# Patient Record
Sex: MALE | Race: WHITE | Employment: FULL TIME | URBAN - METROPOLITAN AREA
[De-identification: names, ages, dates, MRNs, and addresses within clinical notes are randomized per-mention and may not be internally consistent; named-entity substitution may affect disease eponyms.]

---

## 2017-04-05 ENCOUNTER — OFFICE VISIT (OUTPATIENT)
Dept: FAMILY MEDICINE | Facility: CLINIC | Age: 27
End: 2017-04-05

## 2017-04-05 VITALS
BODY MASS INDEX: 32.55 KG/M2 | WEIGHT: 240 LBS | SYSTOLIC BLOOD PRESSURE: 124 MMHG | OXYGEN SATURATION: 98 % | DIASTOLIC BLOOD PRESSURE: 80 MMHG | HEART RATE: 68 BPM

## 2017-04-05 DIAGNOSIS — F90.0 ATTENTION DEFICIT HYPERACTIVITY DISORDER (ADHD), PREDOMINANTLY INATTENTIVE TYPE: Primary | ICD-10-CM

## 2017-04-05 PROCEDURE — 99213 OFFICE O/P EST LOW 20 MIN: CPT | Performed by: FAMILY MEDICINE

## 2017-04-05 RX ORDER — DEXTROAMPHETAMINE SACCHARATE, AMPHETAMINE ASPARTATE MONOHYDRATE, DEXTROAMPHETAMINE SULFATE AND AMPHETAMINE SULFATE 7.5; 7.5; 7.5; 7.5 MG/1; MG/1; MG/1; MG/1
30 CAPSULE, EXTENDED RELEASE ORAL DAILY
Qty: 30 CAPSULE | Refills: 0 | Status: SHIPPED | OUTPATIENT
Start: 2017-08-05 | End: 2017-09-04

## 2017-04-05 RX ORDER — DEXTROAMPHETAMINE SACCHARATE, AMPHETAMINE ASPARTATE MONOHYDRATE, DEXTROAMPHETAMINE SULFATE AND AMPHETAMINE SULFATE 7.5; 7.5; 7.5; 7.5 MG/1; MG/1; MG/1; MG/1
30 CAPSULE, EXTENDED RELEASE ORAL DAILY
Qty: 30 CAPSULE | Refills: 0 | Status: SHIPPED | OUTPATIENT
Start: 2017-07-06 | End: 2017-08-05

## 2017-04-05 RX ORDER — DEXTROAMPHETAMINE SACCHARATE, AMPHETAMINE ASPARTATE MONOHYDRATE, DEXTROAMPHETAMINE SULFATE AND AMPHETAMINE SULFATE 7.5; 7.5; 7.5; 7.5 MG/1; MG/1; MG/1; MG/1
30 CAPSULE, EXTENDED RELEASE ORAL DAILY
Qty: 30 CAPSULE | Refills: 0 | Status: SHIPPED | OUTPATIENT
Start: 2017-05-06 | End: 2017-06-05

## 2017-04-05 RX ORDER — DEXTROAMPHETAMINE SACCHARATE, AMPHETAMINE ASPARTATE MONOHYDRATE, DEXTROAMPHETAMINE SULFATE AND AMPHETAMINE SULFATE 7.5; 7.5; 7.5; 7.5 MG/1; MG/1; MG/1; MG/1
30 CAPSULE, EXTENDED RELEASE ORAL DAILY
Qty: 30 CAPSULE | Refills: 0 | Status: SHIPPED | OUTPATIENT
Start: 2017-09-04 | End: 2017-10-04

## 2017-04-05 RX ORDER — DEXTROAMPHETAMINE SACCHARATE, AMPHETAMINE ASPARTATE MONOHYDRATE, DEXTROAMPHETAMINE SULFATE AND AMPHETAMINE SULFATE 7.5; 7.5; 7.5; 7.5 MG/1; MG/1; MG/1; MG/1
30 CAPSULE, EXTENDED RELEASE ORAL DAILY
Qty: 30 CAPSULE | Refills: 0 | Status: SHIPPED | OUTPATIENT
Start: 2017-04-05 | End: 2017-05-05

## 2017-04-05 RX ORDER — DEXTROAMPHETAMINE SACCHARATE, AMPHETAMINE ASPARTATE MONOHYDRATE, DEXTROAMPHETAMINE SULFATE AND AMPHETAMINE SULFATE 7.5; 7.5; 7.5; 7.5 MG/1; MG/1; MG/1; MG/1
30 CAPSULE, EXTENDED RELEASE ORAL DAILY
Qty: 30 CAPSULE | Refills: 0 | Status: SHIPPED | OUTPATIENT
Start: 2017-06-06 | End: 2017-07-06

## 2017-04-05 NOTE — MR AVS SNAPSHOT
"              After Visit Summary   2017    Cherry Karimi    MRN: [id number]           Patient Information     Date Of Birth          1990        Visit Information        Provider Department      2017 4:15 PM Jamar Garcia MD [facility]        Today's Diagnoses     Attention deficit hyperactivity disorder (ADHD), predominantly inattentive type    -  1       Follow-ups after your visit        Who to contact     If you have questions or need follow up information about today's clinic visit or your schedule please contact [facility] directly at [phone].  Normal or non-critical lab and imaging results will be communicated to you by InnSaniahart, letter or phone within 4 business days after the clinic has received the results. If you do not hear from us within 7 days, please contact the clinic through [email or web address] or phone. If you have a critical or abnormal lab result, we will notify you by phone as soon as possible.  Submit refill requests through Weplay or call your pharmacy and they will forward the refill request to us. Please allow 3 business days for your refill to be completed.          Additional Information About Your Visit        MyChart Information     Weplay lets you send messages to your doctor, view your test results, renew your prescriptions, schedule appointments and more. To sign up, go to [email or web address] . Click on \"Log in\" on the left side of the screen, which will take you to the Welcome page. Then click on \"Sign up Now\" on the right side of the page.     You will be asked to enter the access code listed below, as well as some personal information. Please follow the directions to create your username and password.     Your access code is: [email or web address]  Expires: 2017  4:28 PM     Your access code will  in 90 days. If you need help or a new code, please call your [place] clinic or [phone].        Care EveryWhere ID     This is your Care " EveryWhere ID. This could be used by other organizations to access your [place] medical records  [id number]        Your Vitals Were     Pulse Pulse Oximetry BMI (Body Mass Index)             68 98% 32.55 kg/m2          Blood Pressure from Last 3 Encounters:   04/05/17 124/80   08/10/16 (!) 130/100   01/06/16 128/76    Weight from Last 3 Encounters:   04/05/17 108.9 kg (240 lb)   08/10/16 96.6 kg (213 lb)   01/06/16 108.9 kg (240 lb)              Today, you had the following     No orders found for display         Today's Medication Changes          These changes are accurate as of: 4/5/17  4:28 PM.  If you have any questions, ask your nurse or doctor.               These medicines have changed or have updated prescriptions.        Dose/Directions    * amphetamine-dextroamphetamine 30 MG per 24 hr capsule   Commonly known as:  ADDERALL XR   This may have changed:  Another medication with the same name was added. Make sure you understand how and when to take each.   Used for:  Attention deficit disorder with hyperactivity(314.01)   Changed by:  Jamar Garcia MD        Dose:  30 mg   Take 1 capsule (30 mg) by mouth daily   Quantity:  30 capsule   Refills:  0       * amphetamine-dextroamphetamine 30 MG per 24 hr capsule   Commonly known as:  ADDERALL XR   This may have changed:  Another medication with the same name was added. Make sure you understand how and when to take each.   Used for:  Attention deficit disorder with hyperactivity(314.01)   Changed by:  Jamar Garcia MD        Take 1 tablet by mouth daily   Quantity:  90 capsule   Refills:  0       * amphetamine-dextroamphetamine 30 MG per 24 hr capsule   Commonly known as:  ADDERALL XR   This may have changed:  Another medication with the same name was added. Make sure you understand how and when to take each.   Used for:  Attention deficit disorder with hyperactivity(314.01)   Changed by:  Jamar Garcia MD        Dose:  30 mg   Take 1 capsule  (30 mg) by mouth daily   Quantity:  90 capsule   Refills:  0       * amphetamine-dextroamphetamine 30 MG per 24 hr capsule   Commonly known as:  ADDERALL XR   This may have changed:  Another medication with the same name was added. Make sure you understand how and when to take each.   Used for:  Attention deficit hyperactivity disorder (ADHD), predominantly inattentive type   Changed by:  Jamar Garcia MD        Dose:  30 mg   Take 1 capsule (30 mg) by mouth daily   Quantity:  90 capsule   Refills:  0       * amphetamine-dextroamphetamine 30 MG per 24 hr capsule   Commonly known as:  ADDERALL XR   This may have changed:  Another medication with the same name was added. Make sure you understand how and when to take each.   Used for:  Attention deficit hyperactivity disorder (ADHD), predominantly inattentive type   Changed by:  Jamar Garcia MD        Dose:  30 mg   Take 1 capsule (30 mg) by mouth daily   Quantity:  90 capsule   Refills:  0       * amphetamine-dextroamphetamine 30 MG per 24 hr capsule   Commonly known as:  ADDERALL XR   This may have changed:  You were already taking a medication with the same name, and this prescription was added. Make sure you understand how and when to take each.   Used for:  Attention deficit hyperactivity disorder (ADHD), predominantly inattentive type   Changed by:  Jamar Garcia MD        Dose:  30 mg   Take 1 capsule (30 mg) by mouth daily   Quantity:  30 capsule   Refills:  0       * amphetamine-dextroamphetamine 30 MG per 24 hr capsule   Commonly known as:  ADDERALL XR   This may have changed:  You were already taking a medication with the same name, and this prescription was added. Make sure you understand how and when to take each.   Used for:  Attention deficit hyperactivity disorder (ADHD), predominantly inattentive type   Changed by:  Jamar Garcia MD        Dose:  30 mg   Start taking on:  5/6/2017   Take 1 capsule (30 mg) by mouth daily   Quantity:   30 capsule   Refills:  0       * amphetamine-dextroamphetamine 30 MG per 24 hr capsule   Commonly known as:  ADDERALL XR   This may have changed:  You were already taking a medication with the same name, and this prescription was added. Make sure you understand how and when to take each.   Used for:  Attention deficit hyperactivity disorder (ADHD), predominantly inattentive type   Changed by:  Jamar Garcia MD        Dose:  30 mg   Start taking on:  6/6/2017   Take 1 capsule (30 mg) by mouth daily   Quantity:  30 capsule   Refills:  0       * amphetamine-dextroamphetamine 30 MG per 24 hr capsule   Commonly known as:  ADDERALL XR   This may have changed:  You were already taking a medication with the same name, and this prescription was added. Make sure you understand how and when to take each.   Used for:  Attention deficit hyperactivity disorder (ADHD), predominantly inattentive type   Changed by:  Jamar Garcia MD        Dose:  30 mg   Start taking on:  7/6/2017   Take 1 capsule (30 mg) by mouth daily   Quantity:  30 capsule   Refills:  0       * amphetamine-dextroamphetamine 30 MG per 24 hr capsule   Commonly known as:  ADDERALL XR   This may have changed:  You were already taking a medication with the same name, and this prescription was added. Make sure you understand how and when to take each.   Used for:  Attention deficit hyperactivity disorder (ADHD), predominantly inattentive type   Changed by:  Jamar Garcia MD        Dose:  30 mg   Start taking on:  8/5/2017   Take 1 capsule (30 mg) by mouth daily   Quantity:  30 capsule   Refills:  0       * amphetamine-dextroamphetamine 30 MG per 24 hr capsule   Commonly known as:  ADDERALL XR   This may have changed:  You were already taking a medication with the same name, and this prescription was added. Make sure you understand how and when to take each.   Used for:  Attention deficit hyperactivity disorder (ADHD), predominantly inattentive type    Changed by:  Jamar Garcia MD        Dose:  30 mg   Start taking on:  9/4/2017   Take 1 capsule (30 mg) by mouth daily   Quantity:  30 capsule   Refills:  0       * Notice:  This list has 11 medication(s) that are the same as other medications prescribed for you. Read the directions carefully, and ask your doctor or other care provider to review them with you.         Where to get your medicines      Some of these will need a paper prescription and others can be bought over the counter.  Ask your nurse if you have questions.     Bring a paper prescription for each of these medications     amphetamine-dextroamphetamine 30 MG per 24 hr capsule    amphetamine-dextroamphetamine 30 MG per 24 hr capsule    amphetamine-dextroamphetamine 30 MG per 24 hr capsule    amphetamine-dextroamphetamine 30 MG per 24 hr capsule    amphetamine-dextroamphetamine 30 MG per 24 hr capsule    amphetamine-dextroamphetamine 30 MG per 24 hr capsule                Primary Care Provider Office Phone # Fax #    Jamar Garcia [phone]       [facility] [address] [facility] [address]        Thank you!     Thank you for choosing [facility]  for your care. Our goal is always to provide you with excellent care. Hearing back from our patients is one way we can continue to improve our services. Please take a few minutes to complete the written survey that you may receive in the mail after your visit with us. Thank you!             Your Updated Medication List - Protect others around you: Learn how to safely use, store and throw away your medicines at www.disposemymeds.org.          This list is accurate as of: 4/5/17  4:28 PM.  Always use your most recent med list.                   Brand Name Dispense Instructions for use    * amphetamine-dextroamphetamine 30 MG per 24 hr capsule    ADDERALL XR    30 capsule    Take 1 capsule (30 mg) by mouth daily       * amphetamine-dextroamphetamine 30  MG per 24 hr capsule    ADDERALL XR    90 capsule    Take 1 tablet by mouth daily       * amphetamine-dextroamphetamine 30 MG per 24 hr capsule    ADDERALL XR    90 capsule    Take 1 capsule (30 mg) by mouth daily       * amphetamine-dextroamphetamine 30 MG per 24 hr capsule    ADDERALL XR    90 capsule    Take 1 capsule (30 mg) by mouth daily       * amphetamine-dextroamphetamine 30 MG per 24 hr capsule    ADDERALL XR    90 capsule    Take 1 capsule (30 mg) by mouth daily       * amphetamine-dextroamphetamine 30 MG per 24 hr capsule    ADDERALL XR    30 capsule    Take 1 capsule (30 mg) by mouth daily       * amphetamine-dextroamphetamine 30 MG per 24 hr capsule   Start taking on:  5/6/2017    ADDERALL XR    30 capsule    Take 1 capsule (30 mg) by mouth daily       * amphetamine-dextroamphetamine 30 MG per 24 hr capsule   Start taking on:  6/6/2017    ADDERALL XR    30 capsule    Take 1 capsule (30 mg) by mouth daily       * amphetamine-dextroamphetamine 30 MG per 24 hr capsule   Start taking on:  7/6/2017    ADDERALL XR    30 capsule    Take 1 capsule (30 mg) by mouth daily       * amphetamine-dextroamphetamine 30 MG per 24 hr capsule   Start taking on:  8/5/2017    ADDERALL XR    30 capsule    Take 1 capsule (30 mg) by mouth daily       * amphetamine-dextroamphetamine 30 MG per 24 hr capsule   Start taking on:  9/4/2017    ADDERALL XR    30 capsule    Take 1 capsule (30 mg) by mouth daily       * Notice:  This list has 11 medication(s) that are the same as other medications prescribed for you. Read the directions carefully, and ask your doctor or other care provider to review them with you.

## 2017-04-05 NOTE — PROGRESS NOTES
Recheck ADHD. Denies insomnia, and appetite is fine. No anxiety.  /80  Pulse 68  Wt 108.9 kg (240 lb)  SpO2 98%  BMI 32.55 kg/m2  NAD good mood.   (F90.0) Attention deficit hyperactivity disorder (ADHD), predominantly inattentive type  (primary encounter diagnosis)  Comment: good control . Continue same dose.  Plan: amphetamine-dextroamphetamine (ADDERALL XR) 30         MG per 24 hr capsule,         amphetamine-dextroamphetamine (ADDERALL XR) 30         MG per 24 hr capsule,         amphetamine-dextroamphetamine (ADDERALL XR) 30         MG per 24 hr capsule,         amphetamine-dextroamphetamine (ADDERALL XR) 30         MG per 24 hr capsule,         amphetamine-dextroamphetamine (ADDERALL XR) 30         MG per 24 hr capsule,         amphetamine-dextroamphetamine (ADDERALL XR) 30         MG per 24 hr capsule

## 2017-11-29 ENCOUNTER — OFFICE VISIT (OUTPATIENT)
Dept: FAMILY MEDICINE | Facility: CLINIC | Age: 27
End: 2017-11-29

## 2017-11-29 VITALS
BODY MASS INDEX: 34.31 KG/M2 | HEART RATE: 76 BPM | SYSTOLIC BLOOD PRESSURE: 130 MMHG | DIASTOLIC BLOOD PRESSURE: 90 MMHG | WEIGHT: 253 LBS | OXYGEN SATURATION: 95 %

## 2017-11-29 DIAGNOSIS — Z78.9: ICD-10-CM

## 2017-11-29 DIAGNOSIS — F41.9 ANXIETY: Primary | ICD-10-CM

## 2017-11-29 PROCEDURE — 99214 OFFICE O/P EST MOD 30 MIN: CPT | Performed by: FAMILY MEDICINE

## 2017-11-29 ASSESSMENT — ANXIETY QUESTIONNAIRES
IF YOU CHECKED OFF ANY PROBLEMS ON THIS QUESTIONNAIRE, HOW DIFFICULT HAVE THESE PROBLEMS MADE IT FOR YOU TO DO YOUR WORK, TAKE CARE OF THINGS AT HOME, OR GET ALONG WITH OTHER PEOPLE: SOMEWHAT DIFFICULT
1. FEELING NERVOUS, ANXIOUS, OR ON EDGE: SEVERAL DAYS
2. NOT BEING ABLE TO STOP OR CONTROL WORRYING: MORE THAN HALF THE DAYS
3. WORRYING TOO MUCH ABOUT DIFFERENT THINGS: NOT AT ALL
6. BECOMING EASILY ANNOYED OR IRRITABLE: SEVERAL DAYS
5. BEING SO RESTLESS THAT IT IS HARD TO SIT STILL: SEVERAL DAYS

## 2017-11-29 ASSESSMENT — PATIENT HEALTH QUESTIONNAIRE - PHQ9: 5. POOR APPETITE OR OVEREATING: NOT AT ALL

## 2017-11-29 NOTE — MR AVS SNAPSHOT
"              After Visit Summary   2017    Cherry Karimi    MRN: [id number]           Patient Information     Date Of Birth          1990        Visit Information        Provider Department      2017 8:45 AM Jamar Garcia MD [facility]        Today's Diagnoses     Anxiety    -  1    Alcohol ingestion of more than four drinks per day           Follow-ups after your visit        Who to contact     If you have questions or need follow up information about today's clinic visit or your schedule please contact [facility] directly at [phone].  Normal or non-critical lab and imaging results will be communicated to you by Dopioshart, letter or phone within 4 business days after the clinic has received the results. If you do not hear from us within 7 days, please contact the clinic through [email or web address] or phone. If you have a critical or abnormal lab result, we will notify you by phone as soon as possible.  Submit refill requests through TripHobo or call your pharmacy and they will forward the refill request to us. Please allow 3 business days for your refill to be completed.          Additional Information About Your Visit        MyChart Information     TripHobo lets you send messages to your doctor, view your test results, renew your prescriptions, schedule appointments and more. To sign up, go to [email or web address] . Click on \"Log in\" on the left side of the screen, which will take you to the Welcome page. Then click on \"Sign up Now\" on the right side of the page.     You will be asked to enter the access code listed below, as well as some personal information. Please follow the directions to create your username and password.     Your access code is: [email or web address]  Expires: 2018 10:54 AM     Your access code will  in 90 days. If you need help or a new code, please call your [place] clinic or [phone].        Care EveryWhere ID     This is your Care " EveryWhere ID. This could be used by other organizations to access your [place] medical records  [id number]        Your Vitals Were     Pulse Pulse Oximetry BMI (Body Mass Index)             76 95% 34.31 kg/m2          Blood Pressure from Last 3 Encounters:   11/29/17 130/90   04/05/17 124/80   08/10/16 (!) 130/100    Weight from Last 3 Encounters:   11/29/17 114.8 kg (253 lb)   04/05/17 108.9 kg (240 lb)   08/10/16 96.6 kg (213 lb)              Today, you had the following     No orders found for display         Today's Medication Changes          These changes are accurate as of: 11/29/17 10:54 AM.  If you have any questions, ask your nurse or doctor.               Stop taking these medicines if you haven't already. Please contact your care team if you have questions.     amphetamine-dextroamphetamine 30 MG per 24 hr capsule   Commonly known as:  ADDERALL XR   Stopped by:  Jamar Garcia MD                    Primary Care Provider Office Phone # Fax #    Jamar Garcia [phone] [address] [facility] [address]        Equal Access to Services     [facility] : Hadii aad ku hadasho Soomaali, waaxda luqadaha, qaybta kaalmada adeegyada, waxay claudia larkinn luz san. So [facility] [phone].    ATENCIÓN: Si habla español, tiene a lopez disposición servicios gratuitos de asistencia lingüística. [facility] [phone].    We comply with applicable federal civil rights laws and [place] laws. We do not discriminate on the basis of race, color, national origin, age, disability, sex, sexual orientation, or gender identity.            Thank you!     Thank you for choosing [facility]  for your care. Our goal is always to provide you with excellent care. Hearing back from our patients is one way we can continue to improve our services. Please take a few minutes to complete the written survey that you may receive in the mail after your visit with us. Thank you!              Your Updated Medication List - Protect others around you: Learn how to safely use, store and throw away your medicines at www.disposemymeds.org.      Notice  As of 11/29/2017 10:54 AM    You have not been prescribed any medications.

## 2017-11-29 NOTE — PROGRESS NOTES
"Increased anxiety since stopping Adderall about 6 months ago. No panic attacks. His ADD is tolerable. He has not had any \"life events\", relationships OK, but financial stress. His sleep is good. Exercise is \"not enough\". His diet is too much fast food. He is drinking about 30 drinks a week. No ETOHism in the family.  Father and sister with anxiety issues.  ROS: denies palpitations, SOB, nausea  OBJECTIVE: /90  Pulse 76  Wt 114.8 kg (253 lb)  SpO2 95%  BMI 34.31 kg/m2 alert, oriented. Insight seems fine. Anxious. Improving eye contact over the visit. RRR.    (F41.9) Anxiety  (primary encounter diagnosis)  Comment: poss depression  Plan: lifestyle changes, haja reduced ETOH consumption    (Z78.9) Alcohol ingestion of more than four drinks per day  Comment:   Plan: he believes he can reduce by at least half. Not interested in medication at this time. He will report back if not making good progress in 2 weeks.    >25 min >50% counseling      "

## 2017-12-19 ENCOUNTER — OFFICE VISIT (OUTPATIENT)
Dept: FAMILY MEDICINE | Facility: CLINIC | Age: 27
End: 2017-12-19

## 2017-12-19 VITALS
TEMPERATURE: 98.2 F | RESPIRATION RATE: 20 BRPM | BODY MASS INDEX: 34.08 KG/M2 | HEART RATE: 82 BPM | OXYGEN SATURATION: 96 % | DIASTOLIC BLOOD PRESSURE: 106 MMHG | SYSTOLIC BLOOD PRESSURE: 150 MMHG | WEIGHT: 251.6 LBS | HEIGHT: 72 IN

## 2017-12-19 DIAGNOSIS — J10.1 INFLUENZA A: ICD-10-CM

## 2017-12-19 DIAGNOSIS — R50.9 FEVER AND CHILLS: ICD-10-CM

## 2017-12-19 DIAGNOSIS — R05.9 COUGH: Primary | ICD-10-CM

## 2017-12-19 LAB
% GRANULOCYTES: 70.8 % (ref 42.2–75.2)
FLUAV AG UPPER RESP QL IA.RAPID: ABNORMAL
FLUBV AG UPPER RESP QL IA.RAPID: ABNORMAL
HCT VFR BLD AUTO: 48.9 % (ref 39–51)
HEMOGLOBIN: 16.2 G/DL (ref 13.4–17.5)
LYMPHOCYTES NFR BLD AUTO: 21.3 % (ref 20.5–51.1)
MCH RBC QN AUTO: 29.6 PG (ref 27–31)
MCHC RBC AUTO-ENTMCNC: 33.1 G/DL (ref 33–37)
MCV RBC AUTO: 89.2 FL (ref 80–100)
MONOCYTES NFR BLD AUTO: 7.9 % (ref 1.7–9.3)
PLATELET # BLD AUTO: 211 K/UL (ref 140–450)
RBC # BLD AUTO: 5.48 X10/CMM (ref 4.2–5.9)
WBC # BLD AUTO: 4.5 X10/CMM (ref 3.8–11)

## 2017-12-19 PROCEDURE — 85025 COMPLETE CBC W/AUTO DIFF WBC: CPT | Performed by: FAMILY MEDICINE

## 2017-12-19 PROCEDURE — 36415 COLL VENOUS BLD VENIPUNCTURE: CPT | Performed by: FAMILY MEDICINE

## 2017-12-19 PROCEDURE — 99214 OFFICE O/P EST MOD 30 MIN: CPT | Performed by: FAMILY MEDICINE

## 2017-12-19 PROCEDURE — 87804 INFLUENZA ASSAY W/OPTIC: CPT | Performed by: FAMILY MEDICINE

## 2017-12-19 PROCEDURE — 71020 XR CHEST 2 VW: CPT | Performed by: FAMILY MEDICINE

## 2017-12-19 RX ORDER — OSELTAMIVIR PHOSPHATE 75 MG/1
75 CAPSULE ORAL 2 TIMES DAILY
Qty: 10 CAPSULE | Refills: 0 | Status: SHIPPED | OUTPATIENT
Start: 2017-12-19 | End: 2018-01-15

## 2017-12-19 NOTE — MR AVS SNAPSHOT
"              After Visit Summary   2017    Cherry Karimi    MRN: [id number]           Patient Information     Date Of Birth          1990        Visit Information        Provider Department      2017 11:30 AM Cecilia Albright MD [facility]        Today's Diagnoses     Cough    -  1    Fever and chills        Influenza A           Follow-ups after your visit        Who to contact     If you have questions or need follow up information about today's clinic visit or your schedule please contact [facility] directly at [phone].  Normal or non-critical lab and imaging results will be communicated to you by PLUQhart, letter or phone within 4 business days after the clinic has received the results. If you do not hear from us within 7 days, please contact the clinic through Txt4t or phone. If you have a critical or abnormal lab result, we will notify you by phone as soon as possible.  Submit refill requests through [employer] or call your pharmacy and they will forward the refill request to us. Please allow 3 business days for your refill to be completed.          Additional Information About Your Visit        MyChart Information     [employer] lets you send messages to your doctor, view your test results, renew your prescriptions, schedule appointments and more. To sign up, go to [email or web address] . Click on \"Log in\" on the left side of the screen, which will take you to the Welcome page. Then click on \"Sign up Now\" on the right side of the page.     You will be asked to enter the access code listed below, as well as some personal information. Please follow the directions to create your username and password.     Your access code is: [email or web address]  Expires: 2018 10:54 AM     Your access code will  in 90 days. If you need help or a new code, please call your [place] clinic or [phone].        Care EveryWhere ID     This is your Care EveryWhere ID. This " could be used by other organizations to access your [place] medical records  [id number]        Your Vitals Were     Pulse Temperature Respirations Height Pulse Oximetry BMI (Body Mass Index)    82 98.2  F (36.8  C) 20 1.829 m (6') 96% 34.12 kg/m2       Blood Pressure from Last 3 Encounters:   12/19/17 (!) 150/106   11/29/17 130/90   04/05/17 124/80    Weight from Last 3 Encounters:   12/19/17 114.1 kg (251 lb 9.6 oz)   11/29/17 114.8 kg (253 lb)   04/05/17 108.9 kg (240 lb)              We Performed the Following     CBC with Diff/Plt ([facility])     Influenza Testing ([facility])     X-ray Chest 2 vws*          Today's Medication Changes          These changes are accurate as of: 12/19/17 11:59 PM.  If you have any questions, ask your nurse or doctor.               Start taking these medicines.        Dose/Directions    oseltamivir 75 MG capsule   Commonly known as:  TAMIFLU   Used for:  Influenza A   Started by:  Cecilia Albright MD        Dose:  75 mg   Take 1 capsule (75 mg) by mouth 2 times daily   Quantity:  10 capsule   Refills:  0            Where to get your medicines      [facility] [address] [place] [address] [address] AT [facility] [place]  [address] , [facility] [address]     Phone:  [phone]     oseltamivir 75 MG capsule                Primary Care Provider Office Phone # Fax #    Jamar Garcia [phone] [address]  [facility] [address]        Equal Access to Services     [facility] GITA AH: Hadii ponce bellamy Solele, waaxda luqadaha, qaybta kaalmada adejulianneyakatiana, madhav san. So [facility] [phone].    ATENCIÓN: Si habla español, tiene a lopez disposición servicios gratuitos de asistencia lingüística. Llame al [phone].    We comply with applicable federal civil rights laws and [place] laws. We do not discriminate on the basis of race, color, national origin,  age, disability, sex, sexual orientation, or gender identity.            Thank you!     Thank you for choosing [facility]  for your care. Our goal is always to provide you with excellent care. Hearing back from our patients is one way we can continue to improve our services. Please take a few minutes to complete the written survey that you may receive in the mail after your visit with us. Thank you!             Your Updated Medication List - Protect others around you: Learn how to safely use, store and throw away your medicines at www.disposemymeds.org.          This list is accurate as of: 12/19/17 11:59 PM.  Always use your most recent med list.                   Brand Name Dispense Instructions for use Diagnosis    oseltamivir 75 MG capsule    TAMIFLU    10 capsule    Take 1 capsule (75 mg) by mouth 2 times daily    Influenza A

## 2017-12-19 NOTE — PROGRESS NOTES
Problem(s) Oriented visit        SUBJECTIVE:                                                    Cherry Karimi is a 27 year old male who presents to clinic today for He reports onset 2 days ago of terrible cough, fever and congestion. It came on quickly. He does not typically get flu vaccine. He is not still having fever since being on ibuprofen.       Problem list, Medication list, Allergies, and Medical/Social/Surgical histories reviewed in EPIC and updated as appropriate.   Additional history: as documented    ROS:  5 point ROS completed and negative except noted above, including Gen, CV, Resp, GI, MS      Histories:   Patient Active Problem List   Diagnosis     Attention deficit hyperactivity disorder (ADHD)     Health Care Home     Alcohol ingestion of more than four drinks per day     Anxiety     History reviewed. No pertinent surgical history.    Social History   Substance Use Topics     Smoking status: Former Smoker     Types: Cigars     Start date: 6/7/2013     Smokeless tobacco: Never Used     Alcohol use 6.0 oz/week     12 Cans of beer per week     History reviewed. No pertinent family history.        OBJECTIVE:                                                    BP (!) 150/106  Pulse 82  Temp 98.2  F (36.8  C)  Resp 20  Ht 1.829 m (6')  Wt 114.1 kg (251 lb 9.6 oz)  SpO2 96%  BMI 34.12 kg/m2  Body mass index is 34.12 kg/(m^2).   GENERAL APPEARANCE: Alert, no acute distress  EYES: PERRL, EOM normal, conjunctiva and lids normal  HENT: Ears and TMs normal, oral mucosa and posterior oropharynx normal  NECK: No adenopathy,masses or thyromegaly  RESP: right upper lung field with rhonchi, slight increased expiratory phase without wheeze.  CV: normal rate, regular rhythm, no murmur or gallop  MS: extremities normal, no peripheral edema  NEURO: Alert, oriented, speech and mentation normal  PSYCH: mentation appears normal, affect and mood normal  CXR: no acute pulmonary change.    Labs Resulted Today:    Results for orders placed or performed in visit on 12/19/17   CBC with Diff/Plt ([facility])   Result Value Ref Range    WBC x10/cmm 4.5 3.8 - 11.0 x10/cmm    % Lymphocytes 21.3 20.5 - 51.1 %    % Monocytes 7.9 1.7 - 9.3 %    % Granulocytes 70.8 42.2 - 75.2 %    RBC x10/cmm 5.48 4.2 - 5.9 x10/cmm    Hemoglobin 16.2 13.4 - 17.5 g/dl    Hematocrit 48.9 39 - 51 %    MCV 89.2 80 - 100 fL    MCH 29.6 27.0 - 31.0 pg    MCHC 33.1 33.0 - 37.0 g/dL    Platelet Count 211 140 - 450 K/uL   Influenza Testing ([facility])   Result Value Ref Range    Influenza A Pos (A) neg    Influenza B Neg neg     ASSESSMENT/PLAN:                                                        Cherry was seen today for uri.    Diagnoses and all orders for this visit:    Cough  -     X-ray Chest 2 vws*  -     Influenza Testing ([facility])    Fever and chills  -     CBC with Diff/Plt ([facility])    Influenza A  -     oseltamivir (TAMIFLU) 75 MG capsule; Take 1 capsule (75 mg) by mouth 2 times daily  He is started on Tamiflu, instructed on limiting contact with anyone else, good hand washing and cough covering recommended. Supportive measures (mucinex/sudafed/ibuprofen) discussed. Note is given to be out of work.        The following health maintenance items are reviewed in Epic and correct as of today:  Health Maintenance   Topic Date Due     INFLUENZA VACCINE (SYSTEM ASSIGNED)  09/01/2017     TETANUS IMMUNIZATION (SYSTEM ASSIGNED)  08/10/2019       Cecilia Albright MD  [facility]  Family Practice  [facility]  [phone]    For any issues my office # is [phone]

## 2017-12-19 NOTE — LETTER
[facility] [address] [place] [address] [phone]      December 19, 2017        RE:  Cherry DOWNING Woulf  [address] [facility] [address]              Cherry was seen here today. He tested positive for Influenza A and is not allowed to return to work until 12/25/17.          Sincerely,        Cecilia Albright M.D.

## 2018-01-15 ENCOUNTER — OFFICE VISIT (OUTPATIENT)
Dept: FAMILY MEDICINE | Facility: CLINIC | Age: 28
End: 2018-01-15

## 2018-01-15 VITALS
BODY MASS INDEX: 34.86 KG/M2 | HEART RATE: 58 BPM | SYSTOLIC BLOOD PRESSURE: 144 MMHG | WEIGHT: 257 LBS | DIASTOLIC BLOOD PRESSURE: 90 MMHG | OXYGEN SATURATION: 98 %

## 2018-01-15 DIAGNOSIS — F41.1 GAD (GENERALIZED ANXIETY DISORDER): Primary | ICD-10-CM

## 2018-01-15 PROBLEM — Z78.9: Status: RESOLVED | Noted: 2017-11-29 | Resolved: 2018-01-15

## 2018-01-15 PROCEDURE — 99213 OFFICE O/P EST LOW 20 MIN: CPT | Performed by: FAMILY MEDICINE

## 2018-01-15 ASSESSMENT — ANXIETY QUESTIONNAIRES
6. BECOMING EASILY ANNOYED OR IRRITABLE: SEVERAL DAYS
IF YOU CHECKED OFF ANY PROBLEMS ON THIS QUESTIONNAIRE, HOW DIFFICULT HAVE THESE PROBLEMS MADE IT FOR YOU TO DO YOUR WORK, TAKE CARE OF THINGS AT HOME, OR GET ALONG WITH OTHER PEOPLE: SOMEWHAT DIFFICULT
7. FEELING AFRAID AS IF SOMETHING AWFUL MIGHT HAPPEN: SEVERAL DAYS
1. FEELING NERVOUS, ANXIOUS, OR ON EDGE: SEVERAL DAYS
2. NOT BEING ABLE TO STOP OR CONTROL WORRYING: SEVERAL DAYS
GAD7 TOTAL SCORE: 7
5. BEING SO RESTLESS THAT IT IS HARD TO SIT STILL: SEVERAL DAYS
3. WORRYING TOO MUCH ABOUT DIFFERENT THINGS: SEVERAL DAYS

## 2018-01-15 ASSESSMENT — PATIENT HEALTH QUESTIONNAIRE - PHQ9
SUM OF ALL RESPONSES TO PHQ QUESTIONS 1-9: 10
5. POOR APPETITE OR OVEREATING: SEVERAL DAYS

## 2018-01-15 NOTE — PROGRESS NOTES
Problem(s) Oriented visit        SUBJECTIVE:                                                    Cherry Karimi is a 27 year old male who presents to clinic today for the following health issues :        1. PANKAJ (generalized anxiety disorder)  He has been seeing a counselor, and she suggested he try medication. He is dealing primarily with anxiety, some trouble with high ETOH use in the past, but this is down considerably. He has a FH of sister and father with anxiety, and they do well with sertraline.    Problem list, Medication list, Allergies, and Medical/Social/Surgical histories reviewed in [facility] and updated as appropriate.   Additional history: as documented    ROS:  General and Resp. completed and negative except as noted above    Histories:   Patient Active Problem List   Diagnosis     Attention deficit hyperactivity disorder (ADHD)     Health Care Home     Anxiety     No past surgical history on file.    Social History   Substance Use Topics     Smoking status: Former Smoker     Types: Cigars     Start date: 6/7/2013     Smokeless tobacco: Never Used     Alcohol use 6.0 oz/week     12 Cans of beer per week     No family history on file.        OBJECTIVE:                                                    /90  Pulse 58  Wt 116.6 kg (257 lb)  SpO2 98%  BMI 34.86 kg/m2  Body mass index is 34.86 kg/(m^2).   Constitutional: healthy, alert and no distress   Psychiatric: mentation appears normal; but anxious and decreased eye contact.  judgment and insight intact     ASSESSMENT/PLAN:                                                        Cherry was seen today for recheck.    Diagnoses and all orders for this visit:    PANKAJ (generalized anxiety disorder)  -     sertraline (ZOLOFT) 50 MG tablet; Take 1 tablet (50 mg) by mouth daily        Call me in 2 weeks with update. Anticipate will need higher dose.    The following health maintenance items are reviewed in Epic and correct as of today:  Health Maintenance    Topic Date Due     INFLUENZA VACCINE (SYSTEM ASSIGNED)  09/01/2017     TETANUS IMMUNIZATION (SYSTEM ASSIGNED)  08/10/2019       Jamar Garcia MD  [facility]  [phone]    For any issues my office # is [phone]

## 2018-01-15 NOTE — MR AVS SNAPSHOT
"              After Visit Summary   1/15/2018    Cherry Karimi    MRN: [id number]           Patient Information     Date Of Birth          1990        Visit Information        Provider Department      1/15/2018 12:00 PM Jamar Garcia MD [facility]        Today's Diagnoses     PANKAJ (generalized anxiety disorder)    -  1       Follow-ups after your visit        Who to contact     If you have questions or need follow up information about today's clinic visit or your schedule please contact [facility] directly at [phone].  Normal or non-critical lab and imaging results will be communicated to you by 36Krhart, letter or phone within 4 business days after the clinic has received the results. If you do not hear from us within 7 days, please contact the clinic through Your Policy Managert or phone. If you have a critical or abnormal lab result, we will notify you by phone as soon as possible.  Submit refill requests through [employer] or call your pharmacy and they will forward the refill request to us. Please allow 3 business days for your refill to be completed.          Additional Information About Your Visit        MyChart Information     [employer] lets you send messages to your doctor, view your test results, renew your prescriptions, schedule appointments and more. To sign up, go to [email or web address] . Click on \"Log in\" on the left side of the screen, which will take you to the Welcome page. Then click on \"Sign up Now\" on the right side of the page.     You will be asked to enter the access code listed below, as well as some personal information. Please follow the directions to create your username and password.     Your access code is: [email or web address]  Expires: 2018 10:54 AM     Your access code will  in 90 days. If you need help or a new code, please call your [facility] or [phone].        Care EveryWhere ID     This is your Care EveryWhere ID. This could be used by " other organizations to access your [facility] medical records  [id number]        Your Vitals Were     Pulse Pulse Oximetry BMI (Body Mass Index)             58 98% 34.86 kg/m2          Blood Pressure from Last 3 Encounters:   01/15/18 144/90   12/19/17 (!) 150/106   11/29/17 130/90    Weight from Last 3 Encounters:   01/15/18 116.6 kg (257 lb)   12/19/17 114.1 kg (251 lb 9.6 oz)   11/29/17 114.8 kg (253 lb)              Today, you had the following     No orders found for display         Today's Medication Changes          These changes are accurate as of: 1/15/18  5:43 PM.  If you have any questions, ask your nurse or doctor.               Start taking these medicines.        Dose/Directions    sertraline 50 MG tablet   Commonly known as:  ZOLOFT   Used for:  PANKAJ (generalized anxiety disorder)   Started by:  Jamar Garcia MD        Dose:  50 mg   Take 1 tablet (50 mg) by mouth daily   Quantity:  30 tablet   Refills:  1            Where to get your medicines      These medications were sent to [facility] [address] [place] [address] AT [address] [address]     Phone:  [phone]     sertraline 50 MG tablet                Primary Care Provider Office Phone # Fax #    Jamar Garcia [phone] [address] [facility] [address]        Equal Access to Services     [facility] : Hadii aad ku hadasho Soomaali, waaxda luqadaha, qaybta kaalmada adeegyada, wax claudia gonzalez . So [facility] [phone].    ATENCIÓN: Si habla español, tiene a lopez disposición servicios gratuitos de asistencia lingüística. Llame al [phone].    We comply with applicable federal civil rights laws and [place] laws. We do not discriminate on the basis of race, color, national origin, age, disability, sex, sexual orientation, or gender identity.            Thank you!     Thank you for choosing [facility]   for your care. Our goal is always to provide you with excellent care. Hearing back from our patients is one way we can continue to improve our services. Please take a few minutes to complete the written survey that you may receive in the mail after your visit with us. Thank you!             Your Updated Medication List - Protect others around you: Learn how to safely use, store and throw away your medicines at www.disposemymeds.org.          This list is accurate as of: 1/15/18  5:43 PM.  Always use your most recent med list.                   Brand Name Dispense Instructions for use Diagnosis    sertraline 50 MG tablet    ZOLOFT    30 tablet    Take 1 tablet (50 mg) by mouth daily    PANKAJ (generalized anxiety disorder)

## 2018-01-16 ASSESSMENT — ANXIETY QUESTIONNAIRES: GAD7 TOTAL SCORE: 7

## 2018-02-02 ENCOUNTER — TELEPHONE (OUTPATIENT)
Dept: FAMILY MEDICINE | Facility: CLINIC | Age: 28
End: 2018-02-02

## 2018-02-02 DIAGNOSIS — F41.1 GAD (GENERALIZED ANXIETY DISORDER): ICD-10-CM

## 2018-02-02 NOTE — TELEPHONE ENCOUNTER
Patient called with 2 week update, he has been taking 50mg sertraline x2 weeks.  He reports some improvement of symptoms but as discussed with Dr. Garcia feels that he would like to increase the dose.  Per Dr. Garcia patient is to increase to 75mg QD.  Prescription changed and sent to pharmacy.  Patient is advised to call clinic with update in 2 weeks.  Yu Veloz

## 2018-02-08 NOTE — TELEPHONE ENCOUNTER
Fax from pharmacy that patients rx for Sertraline with increased dosage of 75 mg needs quantity limit exception done.  Submitted on covermymeds.   Received fax back from [employer] that quantity exception was approved.  Approval dates 02/07/2018-02/07/2019.  Called Sarahi to inform them of approval.

## 2018-03-02 ENCOUNTER — OFFICE VISIT (OUTPATIENT)
Dept: FAMILY MEDICINE | Facility: CLINIC | Age: 28
End: 2018-03-02

## 2018-03-02 VITALS
BODY MASS INDEX: 34.56 KG/M2 | WEIGHT: 254.8 LBS | DIASTOLIC BLOOD PRESSURE: 84 MMHG | SYSTOLIC BLOOD PRESSURE: 134 MMHG | HEART RATE: 75 BPM | RESPIRATION RATE: 16 BRPM | OXYGEN SATURATION: 96 %

## 2018-03-02 DIAGNOSIS — F41.1 GAD (GENERALIZED ANXIETY DISORDER): Primary | ICD-10-CM

## 2018-03-02 PROCEDURE — 99213 OFFICE O/P EST LOW 20 MIN: CPT | Performed by: FAMILY MEDICINE

## 2018-03-02 RX ORDER — ESCITALOPRAM OXALATE 20 MG/1
20 TABLET ORAL DAILY
Qty: 30 TABLET | Refills: 1 | Status: SHIPPED | OUTPATIENT
Start: 2018-03-02 | End: 2021-02-24

## 2018-03-02 ASSESSMENT — ANXIETY QUESTIONNAIRES
6. BECOMING EASILY ANNOYED OR IRRITABLE: NOT AT ALL
GAD7 TOTAL SCORE: 6
5. BEING SO RESTLESS THAT IT IS HARD TO SIT STILL: NOT AT ALL
1. FEELING NERVOUS, ANXIOUS, OR ON EDGE: SEVERAL DAYS
3. WORRYING TOO MUCH ABOUT DIFFERENT THINGS: SEVERAL DAYS
7. FEELING AFRAID AS IF SOMETHING AWFUL MIGHT HAPPEN: MORE THAN HALF THE DAYS
2. NOT BEING ABLE TO STOP OR CONTROL WORRYING: SEVERAL DAYS

## 2018-03-02 ASSESSMENT — PATIENT HEALTH QUESTIONNAIRE - PHQ9: 5. POOR APPETITE OR OVEREATING: SEVERAL DAYS

## 2018-03-02 NOTE — PROGRESS NOTES
Here to review the effects of the sertraline. Some benefit with anxiety, but inability to achieve orgasm. He stopped it 3 days ago, he took it for about 5 weeks. Fatigued.  OBJECTIVE: /84  Pulse 75  Resp 16  Wt 115.6 kg (254 lb 12.8 oz)  SpO2 96%  BMI 34.56 kg/m2 NAD slightly flat, but interested and insightful  (F41.1) PANKAJ (generalized anxiety disorder)  (primary encounter diagnosis)  Comment:   Plan: escitalopram (LEXAPRO) 20 MG tablet. If still sexual issues, try bupropion.

## 2018-03-02 NOTE — MR AVS SNAPSHOT
"              After Visit Summary   3/2/2018    Cherry Karimi    MRN: [id number]           Patient Information     Date Of Birth          1990        Visit Information        Provider Department      3/2/2018 7:45 AM Jamar Garcia MD [facility]        Today's Diagnoses     PANKAJ (generalized anxiety disorder)    -  1       Follow-ups after your visit        Who to contact     If you have questions or need follow up information about today's clinic visit or your schedule please contact [facility] directly at [phone].  Normal or non-critical lab and imaging results will be communicated to you by Roomtaghart, letter or phone within 4 business days after the clinic has received the results. If you do not hear from us within 7 days, please contact the clinic through DoPayt or phone. If you have a critical or abnormal lab result, we will notify you by phone as soon as possible.  Submit refill requests through PingStamp or call your pharmacy and they will forward the refill request to us. Please allow 3 business days for your refill to be completed.          Additional Information About Your Visit        MyChart Information     PingStamp lets you send messages to your doctor, view your test results, renew your prescriptions, schedule appointments and more. To sign up, go to [email or web address] . Click on \"Log in\" on the left side of the screen, which will take you to the Welcome page. Then click on \"Sign up Now\" on the right side of the page.     You will be asked to enter the access code listed below, as well as some personal information. Please follow the directions to create your username and password.     Your access code is: [email or web address]  Expires: 2018  8:02 AM     Your access code will  in 90 days. If you need help or a new code, please call your [place] clinic or [phone].        Care EveryWhere ID     This is your Care EveryWhere ID. This could be used by other " organizations to access your [facility] medical records  [id number]        Your Vitals Were     Pulse Respirations Pulse Oximetry BMI (Body Mass Index)          75 16 96% 34.56 kg/m2         Blood Pressure from Last 3 Encounters:   03/02/18 134/84   01/15/18 144/90   12/19/17 (!) 150/106    Weight from Last 3 Encounters:   03/02/18 115.6 kg (254 lb 12.8 oz)   01/15/18 116.6 kg (257 lb)   12/19/17 114.1 kg (251 lb 9.6 oz)              Today, you had the following     No orders found for display         Today's Medication Changes          These changes are accurate as of 3/2/18  8:02 AM.  If you have any questions, ask your nurse or doctor.               Start taking these medicines.        Dose/Directions    escitalopram 20 MG tablet   Commonly known as:  LEXAPRO   Used for:  PANKAJ (generalized anxiety disorder)   Started by:  Jamar Garcia MD        Dose:  20 mg   Take 1 tablet (20 mg) by mouth daily   Quantity:  30 tablet   Refills:  1            Where to get your medicines      These medications were sent to [facility] [facility] [place] [address] AT [facility] & [address] [address]     Phone:  [phone]     escitalopram 20 MG tablet                Primary Care Provider Office Phone # Fax #    Jamar Garcia [phone] [address] [facility] [address]        Equal Access to Services     GUZMAN PELAYO AH: Hadii aad ku hadasho Soomaali, waaxda luqadaha, qaybta kaalmada adeegyada, waxay idiin hayaan adeeg kharash la'aan . So [facility] [phone].    ATENCIÓN: Si habla español, tiene a lopez disposición servicios gratuitos de asistencia lingüística. Llame al [phone].    We comply with applicable federal civil rights laws and [place] laws. We do not discriminate on the basis of race, color, national origin, age, disability, sex, sexual orientation, or gender identity.            Thank you!     Thank you for choosing  [facility]  for your care. Our goal is always to provide you with excellent care. Hearing back from our patients is one way we can continue to improve our services. Please take a few minutes to complete the written survey that you may receive in the mail after your visit with us. Thank you!             Your Updated Medication List - Protect others around you: Learn how to safely use, store and throw away your medicines at www.disposemymeds.org.          This list is accurate as of 3/2/18  8:02 AM.  Always use your most recent med list.                   Brand Name Dispense Instructions for use Diagnosis    escitalopram 20 MG tablet    LEXAPRO    30 tablet    Take 1 tablet (20 mg) by mouth daily    PANKAJ (generalized anxiety disorder)       sertraline 50 MG tablet    ZOLOFT    45 tablet    Take 1.5 tablets (75 mg) by mouth daily    PANKAJ (generalized anxiety disorder)

## 2018-03-03 ASSESSMENT — ANXIETY QUESTIONNAIRES: GAD7 TOTAL SCORE: 6

## 2018-03-03 ASSESSMENT — PATIENT HEALTH QUESTIONNAIRE - PHQ9: SUM OF ALL RESPONSES TO PHQ QUESTIONS 1-9: 8

## 2020-07-08 ENCOUNTER — OFFICE VISIT (OUTPATIENT)
Dept: FAMILY MEDICINE | Facility: CLINIC | Age: 30
End: 2020-07-08

## 2020-07-08 VITALS
DIASTOLIC BLOOD PRESSURE: 80 MMHG | HEART RATE: 78 BPM | HEIGHT: 73 IN | BODY MASS INDEX: 34.67 KG/M2 | RESPIRATION RATE: 16 BRPM | OXYGEN SATURATION: 98 % | TEMPERATURE: 97.9 F | SYSTOLIC BLOOD PRESSURE: 132 MMHG | WEIGHT: 261.6 LBS

## 2020-07-08 DIAGNOSIS — K64.4 SKIN TAG OF PERIANAL REGION: Primary | ICD-10-CM

## 2020-07-08 DIAGNOSIS — K52.9 CHRONIC DIARRHEA: ICD-10-CM

## 2020-07-08 DIAGNOSIS — L98.9 SYMPTOMATIC SKIN LESION: ICD-10-CM

## 2020-07-08 DIAGNOSIS — K64.4 ANAL SKIN TAG: ICD-10-CM

## 2020-07-08 LAB
% GRANULOCYTES: 57.5 % (ref 42.2–75.2)
HCT VFR BLD AUTO: 50.9 % (ref 39–51)
HEMOGLOBIN: 17.5 G/DL (ref 13.4–17.5)
LYMPHOCYTES NFR BLD AUTO: 33.4 % (ref 20.5–51.1)
MCH RBC QN AUTO: 30 PG (ref 27–31)
MCHC RBC AUTO-ENTMCNC: 34.4 G/DL (ref 33–37)
MCV RBC AUTO: 87.2 FL (ref 80–100)
MONOCYTES NFR BLD AUTO: 9.1 % (ref 1.7–9.3)
PLATELET # BLD AUTO: 300 K/UL (ref 140–450)
RBC # BLD AUTO: 5.84 X10/CMM (ref 4.2–5.9)
WBC # BLD AUTO: 7 X10/CMM (ref 3.8–11)

## 2020-07-08 PROCEDURE — 46220 EXCISE ANAL EXT TAG/PAPILLA: CPT | Performed by: FAMILY MEDICINE

## 2020-07-08 PROCEDURE — 99214 OFFICE O/P EST MOD 30 MIN: CPT | Mod: 25 | Performed by: FAMILY MEDICINE

## 2020-07-08 PROCEDURE — 36415 COLL VENOUS BLD VENIPUNCTURE: CPT | Performed by: FAMILY MEDICINE

## 2020-07-08 PROCEDURE — 85025 COMPLETE CBC W/AUTO DIFF WBC: CPT | Performed by: FAMILY MEDICINE

## 2020-07-08 PROCEDURE — 83516 IMMUNOASSAY NONANTIBODY: CPT | Mod: 90 | Performed by: FAMILY MEDICINE

## 2020-07-08 PROCEDURE — 82784 ASSAY IGA/IGD/IGG/IGM EACH: CPT | Mod: 90 | Performed by: FAMILY MEDICINE

## 2020-07-08 PROCEDURE — 84443 ASSAY THYROID STIM HORMONE: CPT | Mod: 90 | Performed by: FAMILY MEDICINE

## 2020-07-08 PROCEDURE — 90471 IMMUNIZATION ADMIN: CPT | Mod: 59 | Performed by: FAMILY MEDICINE

## 2020-07-08 PROCEDURE — 90714 TD VACC NO PRESV 7 YRS+ IM: CPT | Performed by: FAMILY MEDICINE

## 2020-07-08 PROCEDURE — 86140 C-REACTIVE PROTEIN: CPT | Mod: 90 | Performed by: FAMILY MEDICINE

## 2020-07-08 ASSESSMENT — ANXIETY QUESTIONNAIRES
2. NOT BEING ABLE TO STOP OR CONTROL WORRYING: SEVERAL DAYS
6. BECOMING EASILY ANNOYED OR IRRITABLE: NOT AT ALL
3. WORRYING TOO MUCH ABOUT DIFFERENT THINGS: SEVERAL DAYS
1. FEELING NERVOUS, ANXIOUS, OR ON EDGE: SEVERAL DAYS
GAD7 TOTAL SCORE: 4
5. BEING SO RESTLESS THAT IT IS HARD TO SIT STILL: NOT AT ALL
IF YOU CHECKED OFF ANY PROBLEMS ON THIS QUESTIONNAIRE, HOW DIFFICULT HAVE THESE PROBLEMS MADE IT FOR YOU TO DO YOUR WORK, TAKE CARE OF THINGS AT HOME, OR GET ALONG WITH OTHER PEOPLE: NOT DIFFICULT AT ALL
7. FEELING AFRAID AS IF SOMETHING AWFUL MIGHT HAPPEN: SEVERAL DAYS

## 2020-07-08 ASSESSMENT — PATIENT HEALTH QUESTIONNAIRE - PHQ9
SUM OF ALL RESPONSES TO PHQ QUESTIONS 1-9: 6
5. POOR APPETITE OR OVEREATING: NOT AT ALL

## 2020-07-08 ASSESSMENT — MIFFLIN-ST. JEOR: SCORE: 2197.55

## 2020-07-08 NOTE — LETTER
July 13, 2020      Cherry DOWNING Woulf  [address] [facility] [address]      Dear Cherry,     Your initial labs are all normal.  There does not appear to be evidence of significant infection or inflammation.  Celiac testing is also negative.  I will be in touch with stool testing once available.     Resulted Orders   [place] with Diff/Plt [place]   Result Value Ref Range    WBC x10/cmm 7.0 3.8 - 11.0 x10/cmm    % Lymphocytes 33.4 20.5 - 51.1 %    % Monocytes 9.1 1.7 - 9.3 %    % Granulocytes 57.5 42.2 - 75.2 %    RBC x10/cmm 5.84 4.2 - 5.9 x10/cmm    Hemoglobin 17.5 13.4 - 17.5 g/dl    Hematocrit 50.9 39 - 51 %    MCV 87.2 80 - 100 fL    MCH 30.0 27.0 - 31.0 pg    MCHC 34.4 33.0 - 37.0 g/dL    Platelet Count 300 140 - 450 K/uL   C-Reactive Protein  Quant ([facility])   Result Value Ref Range    C-Reactive Protein 3 0 - 10 mg/L    Narrative    Performed at:  01 - [facility] [address], [place], [place]  [id number]  : Francois Johnson MD, Phone:  [phone]   TSH ([facility])   Result Value Ref Range    TSH 2.610 0.450 - 4.500 uIU/mL    Narrative    Performed at:  01 - [facility] [address], [place], [place]  [id number]  : Francois Johnson MD, Phone:  [phone]   IgA+t-Artis ([facility])   Result Value Ref Range     90 - 386 mg/dL    Tissue Transglutaminase Antibody IgA <2 0 - 3 U/mL      Comment:                                    Negative        0 -  3                                Weak Positive   4 - 10                                Positive           >10   Tissue Transglutaminase (tTG) has been identified   as the endomysial antigen.  Studies have demonstr-   ated that endomysial IgA antibodies have over 99%   specificity for gluten sensitive enteropathy.      Narrative    Performed at:  01 - [facility] [address], [place], [place]  [id number]  : Francois Johnson MD, Phone:  [phone]  Performed at:  02 - [address]  [id number]  Lab  Director: Ileana Serrano MD, Phone:  [phone]       If you have any questions or concerns, please call the clinic at the number listed above.       Sincerely,        Jose Russ MD

## 2020-07-08 NOTE — PROGRESS NOTES
"SUBJECTIVE:                                                    Cherry Karimi is a 29 year old male who presents to clinic today for evaluation.    Reports something irritating by anus for over a year.  Irritating but not very painful. There is occasional small bleeding on toilet paper.  No abd pain.      Also reports chronic diarrhea for 1-2 years.  1-3 loose diarrhea stools per day.  No mucus.  No blood.  No fever or chills.  Some cramping and urgency before going that is relieved temporarily after going.  No concerning travel history.  No weight loss or night sweats. No fam hx of IBD.      Problem list, Medication list, Allergies, and Medical/Social/Surgical histories reviewed in [facility] and updated as appropriate.   Additional history: as documented    ROS:    A 7 system review was completed and is as noted in HPI and otherwise negative.      Histories:   Patient Active Problem List   Diagnosis     Attention deficit hyperactivity disorder (ADHD)     Health Care Home     Anxiety     No past surgical history on file.    Social History     Tobacco Use     Smoking status: Former Smoker     Types: Cigars     Start date: 6/7/2013     Smokeless tobacco: Never Used   Substance Use Topics     Alcohol use: Yes     Alcohol/week: 10.0 standard drinks     Types: 12 Cans of beer per week     No family history on file.        OBJECTIVE:                                                    /80   Pulse 78   Temp 97.9  F (36.6  C) (Skin)   Resp 16   Ht 1.842 m (6' 0.5\")   Wt 118.7 kg (261 lb 9.6 oz)   SpO2 98%   BMI 34.99 kg/m    Body mass index is 34.99 kg/m .     General: Well appearing, NAD  Oropharynx: Clear  Abd: soft, nontender  Rectal: pedunculated flesh colored finger-like papule at approximately 10:00.  Skin: Clear without lesions or rash  Psych: Normal mood and affect         PROCEDURE:    After thorough discussion of potential risks, written consent was obtained.  Questions were elicited and answered when able. "  As it was broad based I injected 1 ml of 1% lidocaine with epinephrine. Using forceps and curved iris scissors, it was snipped at the base without complications.  There was minimal bleeding, which was controlled with direct pressure and silver nitrate.  No immediate complications.    ASSESSMENT/PLAN:                                                      Skin tag of perianal region: symptomatic.  Requested excision.  See above.  Will apply vaseline and gauze for a couple days and follow up prn.    Symptomatic skin lesion  -     EXCISION/PAPILLECTOMY ANAL TAG, SINGLE    Chronic diarrhea: favor IBS-D but alternative etiologies should be excluded first.  Work up as below.  Refer to GI pending results.  Trial of psyllium.  -     CBC with Diff/Plt ([facility])  -     C-Reactive Protein  Quant ([facility])  -     Cancel: Calprotectin Fecal ([facility])  -     TSH ([facility])  -     Giardia lamblia Ag  EIA ([facility]); Future  -     IgA+t-Artis ([facility])  -     Calprotectin Fecal ([facility]); Future    Anal skin tag  -     EXCISION/PAPILLECTOMY ANAL TAG, SINGLE    Other orders  -     TD PRESERV FREE, IM (7+ YRS)  -     ADMIN 1st VACCINE        Jose Russ MD  [facility]

## 2020-07-09 ASSESSMENT — ANXIETY QUESTIONNAIRES: GAD7 TOTAL SCORE: 4

## 2020-07-11 LAB
CRP SERPL-MCNC: 3 MG/L (ref 0–10)
IGA: 125 MG/DL (ref 90–386)
TISSUE TRANSGLUTAMINASE ABY IGA: <2 U/ML (ref 0–3)
TSH BLD-ACNC: 2.61 UIU/ML (ref 0.45–4.5)

## 2020-07-13 DIAGNOSIS — K52.9 CHRONIC DIARRHEA: ICD-10-CM

## 2020-07-13 PROCEDURE — 87329 GIARDIA AG IA: CPT | Mod: 90 | Performed by: FAMILY MEDICINE

## 2020-07-15 LAB — GIARDIA LAMBLIA AG, EIA: NEGATIVE

## 2020-07-17 LAB — CALPROTECTIN FECES UG/G: <16 UG/G (ref 0–120)

## 2020-07-22 ENCOUNTER — TELEPHONE (OUTPATIENT)
Dept: FAMILY MEDICINE | Facility: CLINIC | Age: 30
End: 2020-07-22

## 2020-07-22 DIAGNOSIS — K52.9 CHRONIC DIARRHEA: Primary | ICD-10-CM

## 2020-07-22 NOTE — TELEPHONE ENCOUNTER
Patient called clinic to discuss results. Results discussed per Dr Russ. Referral entered and faxed to [facility] along with labs and office notes. Yu Veloz

## 2020-08-06 ENCOUNTER — TRANSFERRED RECORDS (OUTPATIENT)
Dept: FAMILY MEDICINE | Facility: CLINIC | Age: 30
End: 2020-08-06

## 2021-02-24 ENCOUNTER — OFFICE VISIT (OUTPATIENT)
Dept: FAMILY MEDICINE | Facility: CLINIC | Age: 31
End: 2021-02-24

## 2021-02-24 VITALS
HEART RATE: 71 BPM | RESPIRATION RATE: 16 BRPM | OXYGEN SATURATION: 98 % | SYSTOLIC BLOOD PRESSURE: 128 MMHG | DIASTOLIC BLOOD PRESSURE: 84 MMHG | WEIGHT: 271.8 LBS | BODY MASS INDEX: 34.88 KG/M2 | TEMPERATURE: 98.3 F | HEIGHT: 74 IN

## 2021-02-24 DIAGNOSIS — Z83.42 FAMILY HISTORY OF FAMILIAL HYPERCHOLESTEROLEMIA: ICD-10-CM

## 2021-02-24 DIAGNOSIS — Z13.29 SCREENING FOR ENDOCRINE, METABOLIC AND IMMUNITY DISORDER: ICD-10-CM

## 2021-02-24 DIAGNOSIS — E66.09 CLASS 1 OBESITY DUE TO EXCESS CALORIES WITHOUT SERIOUS COMORBIDITY WITH BODY MASS INDEX (BMI) OF 34.0 TO 34.9 IN ADULT: Primary | ICD-10-CM

## 2021-02-24 DIAGNOSIS — E66.811 CLASS 1 OBESITY DUE TO EXCESS CALORIES WITHOUT SERIOUS COMORBIDITY WITH BODY MASS INDEX (BMI) OF 34.0 TO 34.9 IN ADULT: Primary | ICD-10-CM

## 2021-02-24 DIAGNOSIS — Z13.228 SCREENING FOR ENDOCRINE, METABOLIC AND IMMUNITY DISORDER: ICD-10-CM

## 2021-02-24 DIAGNOSIS — Z13.0 SCREENING FOR ENDOCRINE, METABOLIC AND IMMUNITY DISORDER: ICD-10-CM

## 2021-02-24 DIAGNOSIS — Z13.6 SCREENING FOR CARDIOVASCULAR CONDITION: ICD-10-CM

## 2021-02-24 PROCEDURE — 36415 COLL VENOUS BLD VENIPUNCTURE: CPT | Performed by: NURSE PRACTITIONER

## 2021-02-24 PROCEDURE — 99213 OFFICE O/P EST LOW 20 MIN: CPT | Performed by: NURSE PRACTITIONER

## 2021-02-24 SDOH — HEALTH STABILITY: MENTAL HEALTH: HOW OFTEN DO YOU HAVE 6 OR MORE DRINKS ON ONE OCCASION?: WEEKLY

## 2021-02-24 SDOH — HEALTH STABILITY: MENTAL HEALTH: HOW OFTEN DO YOU HAVE A DRINK CONTAINING ALCOHOL?: NOT ASKED

## 2021-02-24 SDOH — HEALTH STABILITY: MENTAL HEALTH: HOW MANY STANDARD DRINKS CONTAINING ALCOHOL DO YOU HAVE ON A TYPICAL DAY?: 5 OR 6

## 2021-02-24 ASSESSMENT — MIFFLIN-ST. JEOR: SCORE: 2266.6

## 2021-02-24 ASSESSMENT — ANXIETY QUESTIONNAIRES
6. BECOMING EASILY ANNOYED OR IRRITABLE: NOT AT ALL
1. FEELING NERVOUS, ANXIOUS, OR ON EDGE: SEVERAL DAYS
3. WORRYING TOO MUCH ABOUT DIFFERENT THINGS: SEVERAL DAYS
2. NOT BEING ABLE TO STOP OR CONTROL WORRYING: NOT AT ALL
GAD7 TOTAL SCORE: 3
5. BEING SO RESTLESS THAT IT IS HARD TO SIT STILL: NOT AT ALL
IF YOU CHECKED OFF ANY PROBLEMS ON THIS QUESTIONNAIRE, HOW DIFFICULT HAVE THESE PROBLEMS MADE IT FOR YOU TO DO YOUR WORK, TAKE CARE OF THINGS AT HOME, OR GET ALONG WITH OTHER PEOPLE: NOT DIFFICULT AT ALL
7. FEELING AFRAID AS IF SOMETHING AWFUL MIGHT HAPPEN: SEVERAL DAYS

## 2021-02-24 ASSESSMENT — PATIENT HEALTH QUESTIONNAIRE - PHQ9
5. POOR APPETITE OR OVEREATING: NOT AT ALL
SUM OF ALL RESPONSES TO PHQ QUESTIONS 1-9: 3

## 2021-02-24 NOTE — PROGRESS NOTES
"Problem(s) Oriented visit        SUBJECTIVE:                                                    Cherry Karimi is a 30 year old male who presents to clinic today for the following health issues :    CC: concern for HLD    Cherry is concerned for familial hyperlipidemia- reports his 36 year old brother was just diagnosed with this, had total cholesterol >300. Reports his mother also has this. Maternal grandfather had early CAD, five MIs. Cherry is asymptomatic, denies chest pain or pressure, SOB/LANGLEY, peripheral edema, claudication, lightheadedness, neuro changes, or vision changes. He does not exercise at the moment, but did just buy a treadmill and has previously run marathons. He and his wife are on week three of a low carb diet- 24h diet recall- chaffles, egg roll in a bowl, tuscan chicken with veggies, asparagus. Drinks four bottles of water daily. Drinks 6-7 beers at a time 1-2 times per week, denies concerns regarding his alcohol use. No smoking or illicit drug use.    Hx of anxiety- off medication, states this is well controlled at this time and denies need for further intervention.    Problem list, Medication list, Allergies, and Medical/Social/Surgical histories reviewed in [facility] and updated as appropriate.   Additional history: as documented    ROS:  Constitutional  Cardiovascular  Respiratory  Neurological  negative except as listed per HPI      OBJECTIVE:                                                    Physical Exam:    /84   Pulse 71   Temp 98.3  F (36.8  C) (Temporal)   Resp 16   Ht 1.886 m (6' 2.25\")   Wt 123.3 kg (271 lb 12.8 oz)   SpO2 98%   BMI 34.66 kg/m      CONSTITUTIONAL: Alert non-toxic appearing male in no acute distress  RESPIRATORY: Lungs clear to auscultation, respirations unlabored  CV: Regular rate and rhythm, S1S2, no clicks, murmurs, rubs, or gallops; carotid arteries without bruit  GASTROINTESTINAL: Normoactive bowel sounds x4 quadrants, abdomen soft, non-distended, and " non-tender to palpation  NEUROLOGIC: No gross deficits  PSYCHIATRIC: Pleasant and interactive, affect euthymic, makes appropriate eye contact, thought process logical       ASSESSMENT/PLAN:                                                          Cherry was seen today for anxiety, recheck medication and blood draw.    Diagnoses and all orders for this visit:    Class 1 obesity due to excess calories without serious comorbidity with body mass index (BMI) of 34.0 to 34.9 in adult  -     VENOUS COLLECTION    Discussed importance of healthy diet and exercise, impact of excess weight and metabolic syndrome    Family history of familial hypercholesterolemia  -     Lipid Panel ([facility])  -     Comp. Metabolic Panel (14) ([facility])  -     VENOUS COLLECTION  -     Creatine Kinase Total Serum ([facility])  -     VENOUS COLLECTION    Check lipid panel- discussed lifestyle changes (dietary and exercise, decreasing alcohol use), impact of elevated cholesterol on health. Reviewed statins, if LDL >190 would start atorvastatin 40mg daily and recheck lipids in 4-12 weeks.    Screening for cardiovascular condition  -     Lipid Panel ([facility])  -     Comp. Metabolic Panel (14) ([facility])  -     VENOUS COLLECTION    Screening for endocrine, metabolic and immunity disorder  -     Comp. Metabolic Panel (14) ([facility])  -     VENOUS COLLECTION                The following health maintenance items are reviewed in Epic and correct as of today:  Health Maintenance   Topic Date Due     PREVENTIVE CARE VISIT  1990     ADVANCE CARE PLANNING  1990     HIV SCREENING  09/16/2005     HEPATITIS C SCREENING  09/16/2008     INFLUENZA VACCINE (1) 09/01/2020     PHQ-2  01/01/2021     DTAP/TDAP/TD IMMUNIZATION (9 - Td) 07/08/2030     HEPATITIS B IMMUNIZATION  Completed     Pneumococcal Vaccine: Pediatrics (0 to 5 Years) and At-Risk Patients (6 to 64 Years)  Aged Out     IPV IMMUNIZATION  Aged Out     MENINGITIS IMMUNIZATION  Aged Out       Patient  Instructions   Checking lipids- sign up for [email or web address], these results will be automatically released. Bell will always you send a message when she has reviewed and formulated a plan.    Work on exercise (150 minutes per week), limiting saturated fats and animal products and processed foods, add in fiber!    Please limit the alcohol- recommend no more than three drinks per sitting and no more than 13 drinks per week      KORI Marcelo CNP  [facility]  Family Practice  [facility]  [phone]    For any issues my office # is [phone]

## 2021-02-24 NOTE — PATIENT INSTRUCTIONS
Checking lipids- sign up for Eloquii, these results will be automatically released. Bell will always you send a message when she has reviewed and formulated a plan.    Work on exercise (150 minutes per week), limiting saturated fats and animal products and processed foods, add in fiber!    Please limit the alcohol- recommend no more than three drinks per sitting and no more than 13 drinks per week

## 2021-02-25 ENCOUNTER — TELEPHONE (OUTPATIENT)
Dept: FAMILY MEDICINE | Facility: CLINIC | Age: 31
End: 2021-02-25

## 2021-02-25 DIAGNOSIS — R74.01 ELEVATED ALT MEASUREMENT: Primary | ICD-10-CM

## 2021-02-25 LAB
ALBUMIN SERPL-MCNC: 4.9 G/DL (ref 4.1–5.2)
ALBUMIN/GLOB SERPL: 2.2 {RATIO} (ref 1.2–2.2)
ALP SERPL-CCNC: 79 IU/L (ref 39–117)
ALT SERPL-CCNC: 80 IU/L (ref 0–44)
AST SERPL-CCNC: 35 IU/L (ref 0–40)
BILIRUB SERPL-MCNC: 0.5 MG/DL (ref 0–1.2)
BUN SERPL-MCNC: 17 MG/DL (ref 6–20)
BUN/CREATININE RATIO: 22 (ref 9–20)
CALCIUM SERPL-MCNC: 9.2 MG/DL (ref 8.7–10.2)
CHLORIDE SERPLBLD-SCNC: 102 MMOL/L (ref 96–106)
CHOLEST SERPL-MCNC: 208 MG/DL (ref 100–199)
CK SERPL-CCNC: 92 U/L (ref 49–439)
CREAT SERPL-MCNC: 0.78 MG/DL (ref 0.76–1.27)
EGFR IF AFRICN AM: 140 ML/MIN/1.73
EGFR IF NONAFRICN AM: 121 ML/MIN/1.73
GLOBULIN, TOTAL: 2.2 G/DL (ref 1.5–4.5)
GLUCOSE SERPL-MCNC: 85 MG/DL (ref 65–99)
HDLC SERPL-MCNC: 56 MG/DL
LDL/HDL RATIO: 2.4 RATIO (ref 0–3.6)
LDLC SERPL CALC-MCNC: 134 MG/DL (ref 0–99)
POTASSIUM SERPL-SCNC: 4.6 MMOL/L (ref 3.5–5.2)
PROT SERPL-MCNC: 7.1 G/DL (ref 6–8.5)
SODIUM SERPL-SCNC: 138 MMOL/L (ref 134–144)
TOTAL CO2: 23 MMOL/L (ref 20–29)
TRIGL SERPL-MCNC: 99 MG/DL (ref 0–149)
VLDLC SERPL CALC-MCNC: 18 MG/DL (ref 5–40)

## 2021-02-25 ASSESSMENT — ANXIETY QUESTIONNAIRES: GAD7 TOTAL SCORE: 3

## 2021-02-25 NOTE — TELEPHONE ENCOUNTER
Called patient with message from Bell regarding lab results. Patient will work on diet, exercise and avoid alcohol. He will repeat labs in 3 months.

## 2021-02-25 NOTE — TELEPHONE ENCOUNTER
----- Message from KORI Marcelo CNP sent at 2/25/2021  9:19 AM CST -----  Dear Cherry, your LDL cholesterol is elevated, but not at the point where we would start medication right now. Continue working on increasing cardiovascular exercise, making the dietary changes we discussed (low saturated fat, increased fiber), and avoiding alcohol intake. Your ALT (a marker of liver injury) is elevated at 80- I would recommend stopping alcohol intake and avoiding Tylenol. Let's recheck this in three months.  Take care,  Bell

## 2021-02-28 ENCOUNTER — HEALTH MAINTENANCE LETTER (OUTPATIENT)
Age: 31
End: 2021-02-28

## 2021-07-25 ENCOUNTER — HOSPITAL ENCOUNTER (EMERGENCY)
Facility: HOSPITAL | Age: 31
Discharge: HOME | End: 2021-07-25
Attending: EMERGENCY MEDICINE | Admitting: EMERGENCY MEDICINE
Payer: COMMERCIAL

## 2021-07-25 ENCOUNTER — APPOINTMENT (OUTPATIENT)
Dept: CT IMAGING | Facility: HOSPITAL | Age: 31
End: 2021-07-25
Payer: COMMERCIAL

## 2021-07-25 VITALS
WEIGHT: 240 LBS | HEIGHT: 73 IN | DIASTOLIC BLOOD PRESSURE: 69 MMHG | SYSTOLIC BLOOD PRESSURE: 122 MMHG | RESPIRATION RATE: 20 BRPM | HEART RATE: 64 BPM | OXYGEN SATURATION: 94 % | BODY MASS INDEX: 31.81 KG/M2 | TEMPERATURE: 98.3 F

## 2021-07-25 DIAGNOSIS — R51.9 NONINTRACTABLE HEADACHE, UNSPECIFIED CHRONICITY PATTERN, UNSPECIFIED HEADACHE TYPE: ICD-10-CM

## 2021-07-25 LAB
ANION GAP SERPL CALCULATED.3IONS-SCNC: 11 MMOL/L (ref 5–18)
BUN SERPL-MCNC: 9 MG/DL (ref 8–22)
CALCIUM SERPL-MCNC: 9.2 MG/DL (ref 8.5–10.5)
CHLORIDE BLD-SCNC: 111 MMOL/L (ref 98–107)
CO2 SERPL-SCNC: 22 MMOL/L (ref 22–31)
CREAT BLD-MCNC: 1.1 MG/DL (ref 0.7–1.3)
CREAT SERPL-MCNC: 0.86 MG/DL (ref 0.7–1.3)
ERYTHROCYTE [DISTWIDTH] IN BLOOD BY AUTOMATED COUNT: 13.2 % (ref 10–15)
GFR SERPL CREATININE-BSD FRML MDRD: >60 ML/MIN/1.73M2
GFR SERPL CREATININE-BSD FRML MDRD: >90 ML/MIN/1.73M2
GLUCOSE BLD-MCNC: 98 MG/DL (ref 70–125)
HCT VFR BLD AUTO: 47.7 % (ref 40–53)
HGB BLD-MCNC: 16.2 G/DL (ref 13.3–17.7)
HOLD SPECIMEN: NORMAL
MCH RBC QN AUTO: 29.7 PG (ref 26.5–33)
MCHC RBC AUTO-ENTMCNC: 34 G/DL (ref 31.5–36.5)
MCV RBC AUTO: 87 FL (ref 78–100)
PLATELET # BLD AUTO: 325 10E3/UL (ref 150–450)
POTASSIUM BLD-SCNC: 4.5 MMOL/L (ref 3.5–5)
RBC # BLD AUTO: 5.46 10E6/UL (ref 4.4–5.9)
SODIUM SERPL-SCNC: 144 MMOL/L (ref 136–145)
WBC # BLD AUTO: 6.1 10E3/UL (ref 4–11)

## 2021-07-25 PROCEDURE — 250N000011 HC RX IP 250 OP 636: Performed by: PHYSICIAN ASSISTANT

## 2021-07-25 PROCEDURE — 85027 COMPLETE CBC AUTOMATED: CPT | Performed by: PHYSICIAN ASSISTANT

## 2021-07-25 PROCEDURE — 96361 HYDRATE IV INFUSION ADD-ON: CPT

## 2021-07-25 PROCEDURE — 258N000003 HC RX IP 258 OP 636: Performed by: PHYSICIAN ASSISTANT

## 2021-07-25 PROCEDURE — 99285 EMERGENCY DEPT VISIT HI MDM: CPT | Mod: 25

## 2021-07-25 PROCEDURE — 96374 THER/PROPH/DIAG INJ IV PUSH: CPT

## 2021-07-25 PROCEDURE — 80048 BASIC METABOLIC PNL TOTAL CA: CPT | Performed by: PHYSICIAN ASSISTANT

## 2021-07-25 PROCEDURE — 36415 COLL VENOUS BLD VENIPUNCTURE: CPT | Performed by: PHYSICIAN ASSISTANT

## 2021-07-25 PROCEDURE — 82565 ASSAY OF CREATININE: CPT

## 2021-07-25 PROCEDURE — 96375 TX/PRO/DX INJ NEW DRUG ADDON: CPT

## 2021-07-25 PROCEDURE — 250N000011 HC RX IP 250 OP 636: Performed by: EMERGENCY MEDICINE

## 2021-07-25 PROCEDURE — 70496 CT ANGIOGRAPHY HEAD: CPT

## 2021-07-25 RX ORDER — KETOROLAC TROMETHAMINE 15 MG/ML
15 INJECTION, SOLUTION INTRAMUSCULAR; INTRAVENOUS ONCE
Status: COMPLETED | OUTPATIENT
Start: 2021-07-25 | End: 2021-07-25

## 2021-07-25 RX ORDER — IOPAMIDOL 755 MG/ML
75 INJECTION, SOLUTION INTRAVASCULAR ONCE
Status: COMPLETED | OUTPATIENT
Start: 2021-07-25 | End: 2021-07-25

## 2021-07-25 RX ORDER — DIPHENHYDRAMINE HYDROCHLORIDE 50 MG/ML
25 INJECTION INTRAMUSCULAR; INTRAVENOUS ONCE
Status: COMPLETED | OUTPATIENT
Start: 2021-07-25 | End: 2021-07-25

## 2021-07-25 RX ADMIN — DIPHENHYDRAMINE HYDROCHLORIDE 25 MG: 50 INJECTION INTRAMUSCULAR; INTRAVENOUS at 09:15

## 2021-07-25 RX ADMIN — KETOROLAC TROMETHAMINE 15 MG: 15 INJECTION, SOLUTION INTRAMUSCULAR; INTRAVENOUS at 09:22

## 2021-07-25 RX ADMIN — IOPAMIDOL 75 ML: 755 INJECTION, SOLUTION INTRAVENOUS at 08:57

## 2021-07-25 RX ADMIN — PROCHLORPERAZINE EDISYLATE 10 MG: 5 INJECTION INTRAMUSCULAR; INTRAVENOUS at 09:15

## 2021-07-25 RX ADMIN — SODIUM CHLORIDE 1000 ML: 9 INJECTION, SOLUTION INTRAVENOUS at 09:22

## 2021-07-25 ASSESSMENT — ENCOUNTER SYMPTOMS
VOICE CHANGE: 0
VOMITING: 0
FEVER: 0
HEMATURIA: 0
LIGHT-HEADEDNESS: 0
WEAKNESS: 0
NAUSEA: 0
SPEECH DIFFICULTY: 0
DIZZINESS: 0
CONFUSION: 0
PALPITATIONS: 0
COLOR CHANGE: 0
SHORTNESS OF BREATH: 0
FATIGUE: 0
COUGH: 0
CHEST TIGHTNESS: 0
FACIAL ASYMMETRY: 0
ARTHRALGIAS: 0
SORE THROAT: 0
WHEEZING: 0
ACTIVITY CHANGE: 0
MYALGIAS: 0
WOUND: 0
DYSURIA: 0
HEADACHES: 1
AGITATION: 0
ABDOMINAL PAIN: 0
RHINORRHEA: 0
ADENOPATHY: 0
CONSTIPATION: 0
FACIAL SWELLING: 0

## 2021-07-25 ASSESSMENT — VISUAL ACUITY
OS: WITH CORRECTIVE LENSES;20/20
OD: WITH CORRECTIVE LENSES;20/20

## 2021-07-25 ASSESSMENT — MIFFLIN-ST. JEOR: SCORE: 2102.51

## 2021-07-25 NOTE — ED NOTES
Patient in CT now. IV in place and labs in progress. Patient reports 5/10 pain, down from 8/10 prior to Oxygen.

## 2021-07-25 NOTE — DISCHARGE INSTRUCTIONS
You were seen in the emergency department today for concern of headache ongoing x1 day.  Thankfully, your laboratory work-up did not show any abnormalities.  Additionally, your head CT does not show any sign of aneurysm or brain bleed.  We discussed the option to perform a lumbar puncture to look for any signs of microscopic blood, you declined this today.    Your headache did resolve after IV fluids and medications (Benadryl, Toradol, Compazine).     Monitor your symptoms closely, if your headache returns, you develop any speech, confusion, memory difficulties fever greater than 100.4  F or any other symptoms concerning to you please return to the emergency department.    I do recommend that you contact your primary care provider regarding your headache today, it is possible this was your first migraine headache sounds like these run in your family.  Your primary care will direct further therapies should migraines become a common headache for you.

## 2021-07-25 NOTE — ED PROVIDER NOTES
I am seeing this patient along with FLORENCIA Guillen.  I have seen and evaluated the patient independently at bedside and agree with the provider's history, assessment and plan.    HPI:  Patient reports sudden onset headache yesterday morning after ejaculating. Denies history of migraines, although he has had similar headaches in the past, but current headache is more intense. Headache is located in his temples and     Physical Exam:    Constitutional:  Awake, no acute distress   Neurologic:  Alert and oriented x3, no focal deficits noted, no nuchal rigidity    LABS  Pertinent lab results reviewed in chart.  Labs Ordered and Resulted from Time of ED Arrival Up to the Time of Departure from the ED   CBC WITH PLATELETS - Normal   ISTAT CREATININE POCT - Normal   BASIC METABOLIC PANEL   EXTRA RED TOP TUBE   EXTRA GREEN TOP (LITHIUM HEPARIN) TUBE   EXTRA PURPLE TOP TUBE   MAY SALINE LOCK IV   CREATININE POCT   EXTRA TUBE    Narrative:     The following orders were created for panel order [place] Draw.  Procedure                               Abnormality         Status                     ---------                               -----------         ------                     Extra Red Top Tube[[id number]]                               In process                 Extra Green Top (Lithium...[[id number]]                      In process                 Extra Purple Top Tube[[id number]]                            In process                   Please view results for these tests on the individual orders.       EKG    RADIOLOGY  CTA Head Neck with Contrast   Final Result   IMPRESSION:    HEAD CT:   1.  Normal head CT.      HEAD CTA:    1.  Normal CTA [place].      NECK CTA:   1.  Normal neck CTA.          PROCEDURES       ED COURSE & MEDICAL DECISION MAKING    8:33AM Melinda Linares PAC staffed the patient with me.   8:47AM I introduced myself to the patient and performed my assessment. PPE: Provider wore gloves,  goggles, N95 mask, and surgical mask.     Pertinent Labs and Imagaing reviewed (see chart for details)    30 year old male here for evaluation of 2 days of headache.  Initially it sounded quite benign and was related to heavy alcohol use, humid weather, possible dehydration and tension versus migraine headache, but then he told us that it had occurred suddenly with ejaculation yesterday morning.  He is nontoxic with no nuchal rigidity, normal vitals and no neurologic deficit so I am less suspicious that 24 hours out this is a subarachnoid hemorrhage, but we did get a CTA which is fortunately negative.  We discussed lumbar puncture with the patient and using shared decision making he decided against lumbar puncture at this time, which I am in agreement with as I think the 1% risk is pretty low.  He was treated with Toradol, Compazine, Benadryl, IV fluids and headache has improved and is otherwise comfortable with discharge with return precautions at this time.    At the conclusion of the encounter I discussed  the results of all of the tests and the disposition.   The questions were answered.  The patient or family acknowledged understanding and was agreeable with the care plan.       FINAL IMPRESSION      1. Nonintractable headache, unspecified chronicity pattern, unspecified headache type                The creation of this record is based on the scribe s observations of the work being performed by Tiffani Sams MD and the provider s statements to them. It was created on their behalf by Terri Carey, a trained medical scribe. This document has been checked and approved by the attending provider.         Tiffani Sams MD  07/25/21 1024

## 2021-07-25 NOTE — ED NOTES
Checked on patient after Benadryl, Compazine, and Toradol. Pt reports feeling sleepy with a pain score of 0/10.

## 2021-07-25 NOTE — ED PROVIDER NOTES
EMERGENCY DEPARTMENT ENCOUNTER      NAME: Cherry Karimi  AGE: 30 year old male  YOB: 1990  MRN: [id number]  EVALUATION DATE & TIME: 7/25/2021  7:56 AM    PCP: Jose Russ    ED PROVIDER: Melinda Linares PA-C      Chief Complaint   Patient presents with     Headache         FINAL IMPRESSION:  1. Nonintractable headache, unspecified chronicity pattern, unspecified headache type          MEDICAL DECISION MAKING:    Pertinent Labs & Imaging studies reviewed. (See chart for details)  30 year old male with a h/o ADHD and anxiety presents to the Emergency Department for evaluation of headache x24 hours which began while ejaculating.  Patient states this is the worst headache of his life.  He does not have a history of migraines.  Currently sensitive to light.  Admits to copious alcohol consumption last night while at a wedding.    On exam he is alert, comfortable appearing with ice pack on head.  Vital signs are WNL.  Neurological exam shows no focal deficits.    Differential diagnosis includes subarachnoid hemorrhage, cluster headache, migraine headache, tension headache, seasonal allergies, malignancy.  CBC and BMP are WNL.  CTA head and neck does not show any abnormalities.    Patient was given IV NS, Toradol after negative head CT, Benadryl and Compazine.  Ultimately, his headache did resolve.    Suspect first-time migraine as etiology for symptoms today.  He does have a family history of migraines which begin near age 30.  Discussed with patient the provisional nature of his diagnosis, and I did offer an LP given negative head CT and greater than 6 hours since onset of headache--he understands the risk of occult bleeding but declines LP at this time.    Patient requested discharge home, we had a conversation regarding strict return precautions should he develop any neurological deficits such as memory, speech, coordination change or fever.  He will follow up with his primary care provider.   "He was discharged home in good condition, driven home by wife Roxana.    0 minutes of critical care time     ED COURSE  8:18 AM Met and evaluated patient. Discussed ED plan.   8:33 AM Staffed the patient with Dr. Sams  8:36 AM I rechecked and updated the patient.  10:00 AM discharged to home in good condition by RN.       MEDICATIONS GIVEN IN THE EMERGENCY:  Medications   iopamidol (ISOVUE-370) solution 75 mL (75 mLs Intravenous Given 7/25/21 0857)   prochlorperazine (COMPAZINE) injection 10 mg (10 mg Intravenous Given 7/25/21 0915)   diphenhydrAMINE (BENADRYL) injection 25 mg (25 mg Intravenous Given 7/25/21 0915)   0.9% sodium chloride BOLUS (0 mLs Intravenous Stopped 7/25/21 0956)   ketorolac (TORADOL) injection 15 mg (15 mg Intravenous Given 7/25/21 0922)       NEW PRESCRIPTIONS STARTED AT TODAY'S ER VISIT  New Prescriptions    No medications on file          =================================================================    HPI    Patient information was obtained from: patient     Use of Intrepreter: N/A         Cherry SALINA Karimi is a 30 year old male with a history of ADHD and anxiety who presents to the ED by walk in for evaluation of a headache.    Patient reports that he had the sudden onset of a headache yesterday morning after ejaculating. Patient does not have a history of consistent migraines, but says this feels like his headaches in the past except more intense. The headache is located in his temples and using an ice pack helps his symptoms. After the onset of his headache yesterday, patient went to a wedding where he drank a substantial amount of alcohol, but did not black out and did not use any other drugs. He woke up this morning with \"screaming pain\" in his head. This headache \"by far\" the worst headache he has ever had in his life. Patient has not done anything for his symptoms. He denies speech difficulty, confusion, memory, numbness, tingling, neck pain, fever, nausea, vomiting, abdominal " pain, bowel changes, urinary changes, appetite changes, vision changes, sore throat, cough, chest pain, or any other complaints at this time.    REVIEW OF SYSTEMS   Review of Systems   Constitutional: Negative for activity change, fatigue and fever.   HENT: Negative for facial swelling, rhinorrhea, sore throat and voice change.    Eyes: Negative for visual disturbance.   Respiratory: Negative for cough, chest tightness, shortness of breath and wheezing.    Cardiovascular: Negative for chest pain and palpitations.   Gastrointestinal: Negative for abdominal pain, constipation, nausea and vomiting.   Genitourinary: Negative for dysuria and hematuria.   Musculoskeletal: Negative for arthralgias and myalgias.   Skin: Negative for color change, pallor, rash and wound.   Neurological: Positive for headaches. Negative for dizziness, facial asymmetry, speech difficulty, weakness and light-headedness.   Hematological: Negative for adenopathy.   Psychiatric/Behavioral: Negative for agitation, behavioral problems and confusion.   All other systems reviewed and are negative.      PAST MEDICAL HISTORY:  Past Medical History:   Diagnosis Date     ADHD (attention deficit hyperactivity disorder)        PAST SURGICAL HISTORY:  Past Surgical History:   Procedure Laterality Date     HC TOOTH EXTRACTION W/FORCEP      2008           CURRENT MEDICATIONS:    No current outpatient medications on file.      ALLERGIES:  No Known Allergies    FAMILY HISTORY:  Family History   Problem Relation Age of Onset     Hyperlipidemia Mother      Anxiety Disorder Mother      Hypertension Father      Anxiety Disorder Father      Anxiety Disorder Sister      Hyperlipidemia Brother      Anxiety Disorder Brother      Coronary Artery Disease Early Onset Maternal Grandfather         five MIs, earliest before 45, but passed away NOT from heart disease       SOCIAL HISTORY:   Social History     Socioeconomic History     Marital status: Single     Spouse name:  "Not on file     Number of children: Not on file     Years of education: Not on file     Highest education level: Not on file   Occupational History     Not on file   Tobacco Use     Smoking status: Former Smoker     Types: Cigars     Start date: 6/7/2013     Smokeless tobacco: Never Used   Substance and Sexual Activity     Alcohol use: Yes     Alcohol/week: 10.0 standard drinks     Types: 12 Cans of beer per week     Drug use: No     Sexual activity: Not on file   Other Topics Concern     Not on file   Social History Narrative     Not on file     Social Determinants of Health     Financial Resource Strain:      Difficulty of Paying Living Expenses:    Food Insecurity:      Worried About Running Out of Food in the Last Year:      Ran Out of Food in the Last Year:    Transportation Needs:      Lack of Transportation (Medical):      Lack of Transportation (Non-Medical):    Physical Activity:      Days of Exercise per Week:      Minutes of Exercise per Session:    Stress:      Feeling of Stress :    Social Connections:      Frequency of Communication with Friends and Family:      Frequency of Social Gatherings with Friends and Family:      Attends Protestant Services:      Active Member of Clubs or Organizations:      Attends Club or Organization Meetings:      Marital Status:    Intimate Partner Violence:      Fear of Current or Ex-Partner:      Emotionally Abused:      Physically Abused:      Sexually Abused:          VITALS:  Patient Vitals for the past 24 hrs:   BP Temp Temp src Pulse Resp SpO2 Height Weight   07/25/21 0930 122/69 -- -- 64 -- 94 % -- --   07/25/21 0722 (!) 157/91 98.3  F (36.8  C) Temporal 83 20 97 % 1.854 m (6' 1\") 108.9 kg (240 lb)       PHYSICAL EXAM    Physical Exam  Vitals reviewed.   Constitutional:       General: He is not in acute distress.     Appearance: He is well-developed. He is not ill-appearing, toxic-appearing or diaphoretic.   HENT:      Head: Normocephalic and atraumatic.      Nose: " Nose normal.      Mouth/Throat:      Mouth: Mucous membranes are moist.   Eyes:      General: No scleral icterus.        Right eye: No discharge.         Left eye: No discharge.      Extraocular Movements: Extraocular movements intact.      Conjunctiva/sclera: Conjunctivae normal.      Pupils: Pupils are equal, round, and reactive to light.   Cardiovascular:      Rate and Rhythm: Normal rate and regular rhythm.      Pulses: Normal pulses.      Heart sounds: No murmur heard.     Pulmonary:      Effort: Pulmonary effort is normal. No tachypnea or respiratory distress.      Breath sounds: Normal breath sounds. No stridor. No decreased breath sounds or wheezing.   Chest:      Chest wall: No tenderness.   Abdominal:      Palpations: Abdomen is soft. There is no mass.      Tenderness: There is no abdominal tenderness. There is no guarding or rebound.   Musculoskeletal:         General: No swelling or tenderness. Normal range of motion.      Cervical back: Normal range of motion and neck supple.      Right lower leg: No edema.      Left lower leg: No edema.   Skin:     General: Skin is warm and dry.      Capillary Refill: Capillary refill takes less than 2 seconds.      Coloration: Skin is not jaundiced or pale.      Findings: No bruising, erythema, lesion or rash.   Neurological:      General: No focal deficit present.      Mental Status: He is alert and oriented to person, place, and time.      Cranial Nerves: No cranial nerve deficit.      Comments: Alert & oriented to person, place and time. Normal tone. PERRL. Normal speech, no dysarthria. CN 2 full visual fields, CN 3/4/6 EOMI without nystagmus, CN 5 sensory intact, CN 7 motor intact, face symmetric, CN 8 hearingintact, CN 9/10/11 normal strength, CN 12 tongue midline.  Motor: Spontaneously moves all extremities. Sensory: Intact face x3, UT, LE. gait: steady, balanced. Psychomotor slowing (-). Abnormal Movements (-).Finger nose finger intact.   Psychiatric:          Mood and Affect: Mood normal.         Behavior: Behavior normal.          LAB:  All pertinent labs reviewed and interpreted.    Labs Ordered and Resulted from Time of ED Arrival Up to the Time of Departure from the ED   BASIC METABOLIC PANEL - Abnormal; Notable for the following components:       Result Value    Chloride 111 (*)     All other components within normal limits   CBC WITH PLATELETS - Normal   ISTAT CREATININE POCT - Normal   EXTRA RED TOP TUBE   EXTRA GREEN TOP (LITHIUM HEPARIN) TUBE   EXTRA PURPLE TOP TUBE   MAY SALINE LOCK IV   EXTRA TUBE    Narrative:     The following orders were created for panel order [place] Draw.  Procedure                               Abnormality         Status                     ---------                               -----------         ------                     Extra Red Top Tube[[id number]]                               Final result               Extra Green Top (Lithium...[[id number]]                      Final result               Extra Purple Top Tube[[id number]]                            Final result                 Please view results for these tests on the individual orders.         RADIOLOGY:  Reviewed all pertinent imaging. Please see official radiology report    CTA Head Neck with Contrast   Final Result   IMPRESSION:    HEAD CT:   1.  Normal head CT.      HEAD CTA:    1.  Normal CTA [place].      NECK CTA:   1.  Normal neck CTA.          I, Javi Lopez, am serving as a scribe to document services personally performed by Melinda Linares PA-C based on my observation and the provider's statements to me. IMelinda PA-C attest that Javi Lopez is acting in a scribe capacity, has observed my performance of the services and has documented them in accordance with my direction.      Melinda Linares PA-C  Emergency Medicine  [facility] EMERGENCY DEPARTMENT  [facility] [facility]  [address]  [phone]  Dept: [phone]     Melinda Linares PA-C  07/25/21 1451

## 2021-07-25 NOTE — ED TRIAGE NOTES
"Patient arrives to triage complaining of a headache that started yesterday morning.  Patient endorsed that he woke up this morning \"and it felt like his head was going to explode.\" Patient endorsed that he was drinking last night but that the pain was so intense this morning he couldn't take it.  Patient alert and oriented, stroke scale negative.    "

## 2021-07-25 NOTE — ED NOTES
Patient endorses recent acute alcohol intake due to a wedding this weekend. Denies nausea, vomiting, diarrhea, fever, chest pain or sob, numbness, tingling or dizziness. States headache is throbbing in both of his temples and he is photophobic. Lights dimmed. Hand graps equal bilaterally. PERRLA, ambulatory without need of assistance.

## 2021-10-03 ENCOUNTER — HEALTH MAINTENANCE LETTER (OUTPATIENT)
Age: 31
End: 2021-10-03

## 2022-03-20 ENCOUNTER — HEALTH MAINTENANCE LETTER (OUTPATIENT)
Age: 32
End: 2022-03-20

## 2022-09-10 ENCOUNTER — HEALTH MAINTENANCE LETTER (OUTPATIENT)
Age: 32
End: 2022-09-10

## 2023-04-30 ENCOUNTER — HEALTH MAINTENANCE LETTER (OUTPATIENT)
Age: 33
End: 2023-04-30

## 2024-07-07 ENCOUNTER — HEALTH MAINTENANCE LETTER (OUTPATIENT)
Age: 34
End: 2024-07-07